# Patient Record
Sex: FEMALE
[De-identification: names, ages, dates, MRNs, and addresses within clinical notes are randomized per-mention and may not be internally consistent; named-entity substitution may affect disease eponyms.]

---

## 2019-09-23 ENCOUNTER — HOSPITAL ENCOUNTER (EMERGENCY)
Dept: HOSPITAL 94 - ER | Age: 29
LOS: 2 days | Discharge: PSYCHIATRIC OR SUBSTANCE USE FACILITY | End: 2019-09-25
Payer: COMMERCIAL

## 2019-09-23 VITALS — HEIGHT: 62 IN | BODY MASS INDEX: 25.82 KG/M2 | WEIGHT: 140.3 LBS

## 2019-09-23 DIAGNOSIS — F15.90: ICD-10-CM

## 2019-09-23 DIAGNOSIS — F41.0: ICD-10-CM

## 2019-09-23 DIAGNOSIS — F17.200: ICD-10-CM

## 2019-09-23 DIAGNOSIS — R00.0: ICD-10-CM

## 2019-09-23 DIAGNOSIS — F29: Primary | ICD-10-CM

## 2019-09-23 DIAGNOSIS — F20.9: ICD-10-CM

## 2019-09-23 LAB
ALBUMIN SERPL BCP-MCNC: 3.9 G/DL (ref 3.4–5)
ALBUMIN/GLOB SERPL: 1.1 {RATIO} (ref 1.1–1.5)
ALP SERPL-CCNC: 45 IU/L (ref 46–116)
ALT SERPL W P-5'-P-CCNC: 45 U/L (ref 12–78)
ANION GAP SERPL CALCULATED.3IONS-SCNC: 10 MMOL/L (ref 8–16)
AST SERPL W P-5'-P-CCNC: 76 U/L (ref 10–37)
BASOPHILS # BLD AUTO: 0.1 X10'3 (ref 0–0.2)
BASOPHILS NFR BLD AUTO: 1.2 % (ref 0–1)
BILIRUB SERPL-MCNC: 1 MG/DL (ref 0.1–1)
BUN SERPL-MCNC: 20 MG/DL (ref 7–18)
BUN/CREAT SERPL: 26.3 (ref 6.6–38)
CALCIUM SERPL-MCNC: 8.7 MG/DL (ref 8.5–10.1)
CHLORIDE SERPL-SCNC: 101 MMOL/L (ref 99–107)
CO2 SERPL-SCNC: 26.8 MMOL/L (ref 24–32)
CREAT SERPL-MCNC: 0.76 MG/DL (ref 0.4–0.9)
EOSINOPHIL # BLD AUTO: 0.1 X10'3 (ref 0–0.9)
EOSINOPHIL NFR BLD AUTO: 1.1 % (ref 0–6)
ERYTHROCYTE [DISTWIDTH] IN BLOOD BY AUTOMATED COUNT: 14.3 % (ref 11.5–14.5)
ETHANOL SERPL-MCNC: < 0.01 GM/DL (ref 0–0.01)
GFR SERPL CREATININE-BSD FRML MDRD: > 90 ML/MIN
GLUCOSE SERPL-MCNC: 77 MG/DL (ref 70–104)
HCT VFR BLD AUTO: 36.3 % (ref 35–45)
HGB BLD-MCNC: 12.3 G/DL (ref 12–16)
LYMPHOCYTES # BLD AUTO: 1.7 X10'3 (ref 1.1–4.8)
LYMPHOCYTES NFR BLD AUTO: 26.7 % (ref 21–51)
MCH RBC QN AUTO: 31.7 PG (ref 27–31)
MCHC RBC AUTO-ENTMCNC: 34 G/DL (ref 33–36.5)
MCV RBC AUTO: 93.4 FL (ref 78–98)
MONOCYTES # BLD AUTO: 0.7 X10'3 (ref 0–0.9)
MONOCYTES NFR BLD AUTO: 11.7 % (ref 2–12)
NEUTROPHILS # BLD AUTO: 3.8 X10'3 (ref 1.8–7.7)
NEUTROPHILS NFR BLD AUTO: 59.3 % (ref 42–75)
PLATELET # BLD AUTO: 241 X10'3 (ref 140–440)
PMV BLD AUTO: 7.5 FL (ref 7.4–10.4)
POTASSIUM SERPL-SCNC: 3.1 MMOL/L (ref 3.5–5.1)
PROT SERPL-MCNC: 7.3 G/DL (ref 6.4–8.2)
RBC # BLD AUTO: 3.89 X10'6 (ref 4.2–5.6)
SODIUM SERPL-SCNC: 138 MMOL/L (ref 135–145)
WBC # BLD AUTO: 6.3 X10'3 (ref 4.5–11)

## 2019-09-23 PROCEDURE — 36415 COLL VENOUS BLD VENIPUNCTURE: CPT

## 2019-09-23 PROCEDURE — 80320 DRUG SCREEN QUANTALCOHOLS: CPT

## 2019-09-23 PROCEDURE — 80305 DRUG TEST PRSMV DIR OPT OBS: CPT

## 2019-09-23 PROCEDURE — 99285 EMERGENCY DEPT VISIT HI MDM: CPT

## 2019-09-23 PROCEDURE — 81003 URINALYSIS AUTO W/O SCOPE: CPT

## 2019-09-23 PROCEDURE — 96372 THER/PROPH/DIAG INJ SC/IM: CPT

## 2019-09-23 PROCEDURE — 80053 COMPREHEN METABOLIC PANEL: CPT

## 2019-09-23 PROCEDURE — 84443 ASSAY THYROID STIM HORMONE: CPT

## 2019-09-23 PROCEDURE — 81025 URINE PREGNANCY TEST: CPT

## 2019-09-23 PROCEDURE — 85025 COMPLETE CBC W/AUTO DIFF WBC: CPT

## 2019-09-23 NOTE — NUR
Pt is resting in bed peacefully at this time on her left side. No distress 
noted, will conitnue to monitor.
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
## 2019-09-23 NOTE — NUR
Pt is resting in bed peacefully at this time in a supine position. No distress 
observed. Will conitnue to monitor.

## 2019-09-23 NOTE — NUR
Pt is resting in bed peacefully. no distress observed. Will conitnue to 
monitor. Pt did not eat lunch or snack.

## 2019-09-24 LAB
AMPHETAMINES UR QL SCN: POSITIVE
BARBITURATES UR QL SCN: NEGATIVE
BENZODIAZ UR QL SCN: NEGATIVE
BZE UR QL SCN: NEGATIVE
CANNABINOIDS UR QL SCN: POSITIVE
CLARITY UR: CLEAR
COLOR UR: YELLOW
GLUCOSE UR STRIP-MCNC: NEGATIVE MG/DL
HCG UR QL: NEGATIVE
HGB UR QL STRIP: NEGATIVE
KETONES UR STRIP-MCNC: 15 MG/DL
LEUKOCYTE ESTERASE UR QL STRIP: NEGATIVE
METHADONE UR QL SCN: NEGATIVE
NITRITE UR QL STRIP: NEGATIVE
OPIATES UR QL SCN: NEGATIVE
PCP UR QL SCN: NEGATIVE
PH UR STRIP: 6 [PH] (ref 4.8–8)
PROT UR QL STRIP: NEGATIVE MG/DL
SP GR UR STRIP: >=1.03 (ref 1–1.03)
URN COLLECT METHOD CLASS: (no result)
UROBILINOGEN UR STRIP-MCNC: 1 E.U/DL (ref 0.2–1)

## 2019-09-24 NOTE — NUR
PT PERFORMED DEFENSIVE KARATE-LIKE MOVES WHEN AWOKEN FOR MED ADMINISTRATION. 
WAS ABLE TO REDIRECT AND DEESCALATE PT QUICKLY AND PT WAS COOPERATIVE.

## 2019-09-24 NOTE — NUR
The patient is laying on her left side with eyes closed. Resp. are even and 
unlabored. No s/s of distress.

## 2019-09-24 NOTE — NUR
Patient was brought her breakfast and patient rolled over but did not want to 
awaken.  Patient sleeping prone.  No distress observed.  Continue to monitor.

## 2019-09-24 NOTE — NUR
Patient awoke and gave a urine sample. Patient eating at bedside.  No distress 
observed.  Continue to monitor.

## 2019-09-24 NOTE — NUR
Awakened for vital signs. Stating she felt "pain all through my body."

Given Tylenol to address pain.

## 2019-09-24 NOTE — NUR
The patient is laying on her right side with eyes closed. Resp. are even and 
unlabored. No s/s of distress.

## 2019-09-24 NOTE — NUR
Patient taking medication for low potassium and Registration speaking to 
patient.  Continue to monitor.

## 2019-09-24 NOTE — NUR
Remains asleep at this time. Has slept for the remainder of the afternoon with 
the covers over her head. In line of sight of staff at all times.

## 2019-09-25 VITALS — DIASTOLIC BLOOD PRESSURE: 68 MMHG | SYSTOLIC BLOOD PRESSURE: 119 MMHG

## 2019-09-25 NOTE — NUR
The patient has been accepted at [facility] [place] and will be picked up the 
[facility]  this evening.

## 2019-09-25 NOTE — NUR
PACKET FAXED TO [facility], AND CALLED TO CONFIRM WITH THE COUNTY THAT THEY RECIEVED 
THE PACKET

## 2019-09-25 NOTE — NUR
Patient is psychotic and talking about all her mommies are watching the RN.  
Patient laughing and talking to herself.  Patient appears to be responding to 
internal stimuli.  RN requested anti-psychotic medication for patient.  
Continue to monitor.

## 2019-09-25 NOTE — NUR
One to one with the patient to assess severity of disordered thought processes 
and risk to harm herself and others. THe patient has been observed on the unit 
being calm and cooperative with the unit routine. She ate 100% of her dinner. 
She denies A/V hallucinations but appeared distracted and gave limited person 
information. She stated that she has been "venturing around" and is from 
[facility]. She has no family in the [facility] and stated the closest 
relatives she has are living in [place]. She has no fixed income and stated 
she earns money from "odd jobs" When asked why she was here she did not appear 
to recall the circumstances of her admit and only stated that she came her 
because "I didn't feel good" and then added, "Oh it just has to do with the 
weather or something" SHe denies haaving a psychiatric history or being on 
psychiatric medications. 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 
year. She is unable to identify a plan for self care if she were to leave the 
hospital.